# Patient Record
Sex: MALE | Race: WHITE | NOT HISPANIC OR LATINO | Employment: FULL TIME | URBAN - METROPOLITAN AREA
[De-identification: names, ages, dates, MRNs, and addresses within clinical notes are randomized per-mention and may not be internally consistent; named-entity substitution may affect disease eponyms.]

---

## 2017-10-04 RX ORDER — GLUCOSAM/CHON-MSM1/C/MANG/BOSW 500-416.6
TABLET ORAL
Refills: 0 | COMMUNITY
Start: 2017-10-02

## 2017-10-04 RX ORDER — INFLUENZA VIRUS VACCINE 15; 15; 15; 15 UG/.5ML; UG/.5ML; UG/.5ML; UG/.5ML
SUSPENSION INTRAMUSCULAR
Refills: 0 | COMMUNITY
Start: 2017-09-13 | End: 2017-12-18

## 2017-10-04 RX ORDER — METFORMIN HYDROCHLORIDE 750 MG/1
TABLET, EXTENDED RELEASE ORAL
Refills: 1 | COMMUNITY
Start: 2017-09-27

## 2017-10-04 RX ORDER — CALCIUM CITRATE/VITAMIN D3 200MG-6.25
TABLET ORAL
Refills: 0 | COMMUNITY
Start: 2017-10-02

## 2017-12-18 ENCOUNTER — OFFICE VISIT (OUTPATIENT)
Dept: NEUROSURGERY | Facility: CLINIC | Age: 57
End: 2017-12-18

## 2017-12-18 VITALS
HEIGHT: 70 IN | DIASTOLIC BLOOD PRESSURE: 81 MMHG | SYSTOLIC BLOOD PRESSURE: 152 MMHG | WEIGHT: 205 LBS | BODY MASS INDEX: 29.35 KG/M2 | HEART RATE: 63 BPM

## 2017-12-18 DIAGNOSIS — M54.50 CHRONIC MIDLINE LOW BACK PAIN WITHOUT SCIATICA: ICD-10-CM

## 2017-12-18 DIAGNOSIS — M51.36 DDD (DEGENERATIVE DISC DISEASE), LUMBAR: Primary | ICD-10-CM

## 2017-12-18 DIAGNOSIS — G89.29 CHRONIC MIDLINE LOW BACK PAIN WITHOUT SCIATICA: ICD-10-CM

## 2017-12-18 PROCEDURE — 99203 OFFICE O/P NEW LOW 30 MIN: CPT | Performed by: NEUROLOGICAL SURGERY

## 2017-12-18 RX ORDER — CARVEDILOL 6.25 MG/1
TABLET ORAL
COMMUNITY
Start: 2017-11-13

## 2017-12-18 RX ORDER — RAMIPRIL 2.5 MG/1
CAPSULE ORAL
COMMUNITY
Start: 2017-11-13

## 2017-12-18 RX ORDER — TIZANIDINE 4 MG/1
TABLET ORAL
COMMUNITY
Start: 2017-09-14

## 2017-12-18 NOTE — PROGRESS NOTES
Subjective   Patient ID: Lorenzo James is a 56 y.o. male is being seen for consultation today at the request of Self Referring for back pain.    Today the patient reports having back pain with intermittent pain radiating into the right leg. He states that he has had minimal pain for the past month and a half. He states that when he has a flare up that's when the pain is the worse.     Back Pain   This is a chronic problem. The current episode started more than 1 year ago. The problem occurs intermittently. The pain is present in the lumbar spine. Associated symptoms include leg pain. Pertinent negatives include no bladder incontinence, bowel incontinence, numbness, tingling or weakness.       The following portions of the patient's history were reviewed and updated as appropriate: allergies, current medications, past family history, past medical history, past social history, past surgical history and problem list.    Review of Systems   Gastrointestinal: Negative for bowel incontinence.   Genitourinary: Negative for bladder incontinence.   Musculoskeletal: Positive for back pain.   Neurological: Negative for tingling, weakness and numbness.   All other systems reviewed and are negative.    The patient is here with his wife.  I have operated on his sister.  He is from [place], [place].  He has about a 7 year history of off and on low back pain.  Occasionally he gets pain into his right groin and down his leg, but that is few and far between.  Mainly it is the back itself.  He has about 2 or 3 flare-ups per year.  Right now he is actually feeling well.  He has had epidural blocks in the past which did not help.  Physical therapy was tried last summer which helped a little bit, but he never continued exercises.  He does not smoke.  He does not drink.  He is not overweight.  He has a non-physical job.  We discussed the MRI and the fact that he certainly does not need any surgical treatment.  He went to see  Josiah Fierro MD in [place], before he would try it and told him such as well.  He does not exercise and I think he could probably reduce some of the symptoms by working on core strengthening and flexibility.  I talked to him about how to do planks and other core strengthening exercises and recommended that he do that regularly.  We can keep it open-ended.        Objective   Physical Exam   Constitutional: He is oriented to person, place, and time. He appears well-developed and well-nourished.   HENT:   Head: Normocephalic and atraumatic.   Eyes: Conjunctivae and EOM are normal. Pupils are equal, round, and reactive to light.   Fundoscopic exam:       The right eye shows no papilledema. The right eye shows venous pulsations.        The left eye shows no papilledema. The left eye shows venous pulsations.   Neck: Carotid bruit is not present.   Neurological: He is oriented to person, place, and time. He has a normal Finger-Nose-Finger Test and a normal Heel to Shin Test. Gait normal.   Reflex Scores:       Tricep reflexes are 2+ on the right side and 2+ on the left side.       Bicep reflexes are 2+ on the right side and 2+ on the left side.       Brachioradialis reflexes are 2+ on the right side and 2+ on the left side.       Patellar reflexes are 2+ on the right side and 2+ on the left side.       Achilles reflexes are 2+ on the right side and 2+ on the left side.  Psychiatric: His speech is normal.     Neurologic Exam     Mental Status   Oriented to person, place, and time.   Registration of memory: Good recent and remote memory.   Attention: normal. Concentration: normal.   Speech: speech is normal   Level of consciousness: alert  Knowledge: consistent with education.     Cranial Nerves     CN II   Visual fields full to confrontation.   Visual acuity: normal    CN III, IV, VI   Pupils are equal, round, and reactive to light.  Extraocular motions are normal.     CN V   Facial sensation intact.   Right corneal  reflex: normal  Left corneal reflex: normal    CN VII   Facial expression full, symmetric.   Right facial weakness: none  Left facial weakness: none    CN VIII   Hearing: intact    CN IX, X   Palate: symmetric    CN XI   Right sternocleidomastoid strength: normal  Left sternocleidomastoid strength: normal    CN XII   Tongue: not atrophic  Tongue deviation: none    Motor Exam   Muscle bulk: normal  Right arm tone: normal  Left arm tone: normal  Right leg tone: normal  Left leg tone: normal    Strength   Strength 5/5 except as noted.     Sensory Exam   Light touch normal.     Gait, Coordination, and Reflexes     Gait  Gait: normal    Coordination   Finger to nose coordination: normal  Heel to shin coordination: normal    Reflexes   Right brachioradialis: 2+  Left brachioradialis: 2+  Right biceps: 2+  Left biceps: 2+  Right triceps: 2+  Left triceps: 2+  Right patellar: 2+  Left patellar: 2+  Right achilles: 2+  Left achilles: 2+  Right : 2+  Left : 2+      Assessment/Plan   Independent Review of Radiographic Studies:    I have reviewed the MRI done in 08/2016 of the lumbar spine which does show multilevel disc bulging at L1-L2 all the way to L4-L5 without any significant stenosis or herniated discs.          Medical Decision Making:    He is generally feeling better, so we can keep it open ended.  I told him to start doing the exercises that he was instructed how to do when he was having physical therapy and to work on plank exercises and core strengthening.  If his back flares up in a bad way, he can contact us and we can arrange for him to have medial branch blocks and possibly facet blocks.  These will have to be done in [place], [place].  If he develops severe radiating sciatica that is even worse than the back pain then he should let us know and come back to see me.        Lorenzo was seen today for back pain.    Diagnoses and all orders for this visit:    DDD (degenerative disc disease),  lumbar    Chronic midline low back pain without sciatica    Return if symptoms worsen or fail to improve.